# Patient Record
Sex: FEMALE | Race: WHITE | Employment: FULL TIME | URBAN - METROPOLITAN AREA
[De-identification: names, ages, dates, MRNs, and addresses within clinical notes are randomized per-mention and may not be internally consistent; named-entity substitution may affect disease eponyms.]

---

## 2018-12-10 PROBLEM — D24.2 BREAST FIBROADENOMA IN FEMALE, LEFT: Status: ACTIVE | Noted: 2018-12-10

## 2024-01-19 PROBLEM — D68.59 THROMBOPHILIA (HCC): Status: ACTIVE | Noted: 2024-01-19

## 2024-09-29 ENCOUNTER — HOSPITAL ENCOUNTER (EMERGENCY)
Age: 34
Discharge: HOME | End: 2024-09-29
Payer: COMMERCIAL

## 2024-09-29 VITALS
TEMPERATURE: 98.3 F | RESPIRATION RATE: 16 BRPM | OXYGEN SATURATION: 98 % | HEART RATE: 82 BPM | SYSTOLIC BLOOD PRESSURE: 107 MMHG | DIASTOLIC BLOOD PRESSURE: 75 MMHG

## 2024-09-29 DIAGNOSIS — J40 BRONCHITIS: Primary | ICD-10-CM

## 2024-09-29 PROCEDURE — 99211 OFF/OP EST MAY X REQ PHY/QHP: CPT

## 2024-09-29 RX ORDER — METHYLPREDNISOLONE 4 MG
TABLET, DOSE PACK ORAL
Qty: 1 KIT | Refills: 0 | Status: SHIPPED | OUTPATIENT
Start: 2024-09-29 | End: 2024-10-05

## 2024-09-29 RX ORDER — BROMPHENIRAMINE MALEATE, PSEUDOEPHEDRINE HYDROCHLORIDE, AND DEXTROMETHORPHAN HYDROBROMIDE 2; 30; 10 MG/5ML; MG/5ML; MG/5ML
5 SYRUP ORAL 4 TIMES DAILY PRN
Qty: 118 ML | Refills: 0 | Status: SHIPPED | OUTPATIENT
Start: 2024-09-29

## 2024-09-29 RX ORDER — AZITHROMYCIN 250 MG/1
TABLET, FILM COATED ORAL
Qty: 1 PACKET | Refills: 0 | Status: SHIPPED | OUTPATIENT
Start: 2024-09-29 | End: 2024-10-03

## 2024-09-29 NOTE — ED PROVIDER NOTES
Department of Emergency Medicine   ED  Encounter Note  Admit Date/RoomTime: 2024  8:50 AM  ED Room:     NAME: Liliam Yo  : 1990  MRN: [id number]     Chief Complaint:  URI ( Symptoms last two weeks cough still there despite numerous OTC meds   cough mostly dry )    History of Present Illness       Liliam Yo is a 34 y.o. old female who presents to urgent care by private vehicle, for cough, which began a few week(s) prior to arrival.  Since onset the symptoms have been stable and mild-moderate in severity. The symptoms are associated with wheezing.  There has been no chest pain, shortness of breath, hemoptysis vomiting or diarrhea.  Denies possibility of pregnancy.    ROS   Pertinent positives and negatives are stated within HPI, all other systems reviewed and are negative.    Past Medical History:  has a past medical history of Breast fibroadenoma, Thrombophilia (HCC), and Thyroid disease.    Surgical History:  has no past surgical history on file.    Social History:  reports that she has been smoking cigarettes. She has never used smokeless tobacco. She reports current alcohol use. She reports that she does not use drugs.    Family History: family history includes Colon Cancer in her paternal grandmother; Esophageal Cancer in her paternal grandfather; Lung Cancer in her maternal grandfather.     Allergies: Patient has no known allergies.    Physical Exam   Oxygen Saturation Interpretation: Normal on room air analysis.        ED Triage Vitals [24 0914]   BP Systolic BP Percentile Diastolic BP Percentile Temp Temp src Pulse Respirations SpO2   107/75 -- -- 98.3 °F (36.8 °C) -- 82 16 98 %      Height Weight         -- --               Constitutional:  Alert, development consistent with age.  Ears:  External Ears: Bilateral normal.               TM's & External Canals: normal TM's and external ear canals bilaterally.  Nose:   There is no discharge, swelling or lesions